# Patient Record
Sex: MALE | Race: OTHER | HISPANIC OR LATINO | URBAN - METROPOLITAN AREA
[De-identification: names, ages, dates, MRNs, and addresses within clinical notes are randomized per-mention and may not be internally consistent; named-entity substitution may affect disease eponyms.]

---
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
## 2024-08-07 NOTE — PATIENT PROFILE ADULT - LIVING ENVIRONMENT
08/07/24 0500   Clinical Encounter Info   Visit Type In person   Visited With Patient;Family/Friend   Reason for Visit Emergency Code   Spiritual Care Consult Needed Spiritual Care eval completed   Referral from Interdisciplinary team   Spiritual Care Visit Preference    Requires Follow Up No   Taxonomy   Intended Effects De-escalte emotionally charged situations;Convey a calming presence;Cindi Affirmation   Methods Exploring hope;Offer emotional support   Interventions Provide compassionate touch;[place]   Patient Assessment   Patient Affect at Time of Visit Alert;In pain   Patient Spiritual Assessment At peace;Anxious   Patient Spiritual Practice Prayer   Patient  Intervention Prayer   Family/Friend Assessment   Family/Friend Name ana (Spouse)   Family/Friend Affect at Time of Visit Alert;Cooperative;Open to  visit   Family/Friend Spiritual Assessment Hopeful   Family/Friend  Intervention  Support;Prayer   Spiritual Plan of Care   Spiritual Plan of Care No plan for follow up at this time      referred to patient as an emergency code (TL2/Fall).  respond to crisis event. Patient was alert and verbal. Pt was open to  visit and conversation.  was able assess pt spirituality.  acknowledged the situation.  was able to interacted w/pt and provide calming presence.  established a supportive/caring relationship.   extended support to our tx team.  remain in tower available for support as needed for Pt or family. Not Further follow up needed at this time for morning  for ES and Sp. Support.      Rev Mckay Prieto M.Div.   [facility]  [facility]  O: [phone]  
no

## (undated) DEVICE — GLV 7.5 PROTEXIS (WHITE)

## (undated) DEVICE — PACK GENERAL LAPAROSCOPY

## (undated) DEVICE — SOL IRR POUR H2O 500ML

## (undated) DEVICE — ENDOCATCH 10MM SPECIMEN POUCH

## (undated) DEVICE — D HELP - CLEARVIEW CLEARIFY SYSTEM

## (undated) DEVICE — DRSG TELFA 3 X 8

## (undated) DEVICE — TROCAR COVIDIEN BLUNT TIP HASSAN 12MM

## (undated) DEVICE — POSITIONER STRAP ARMBOARD VELCRO TS-30

## (undated) DEVICE — TUBING OLYMPUS INSUFFLATION

## (undated) DEVICE — LIGASURE IMPACT

## (undated) DEVICE — TIP METZENBAUM SCISSOR MONOPOLAR ENDOCUT (ORANGE)

## (undated) DEVICE — BLADE SURGICAL #15 CARBON

## (undated) DEVICE — ELCTR GROUNDING PAD ADULT COVIDIEN

## (undated) DEVICE — BASIN SET SINGLE

## (undated) DEVICE — WARMING BLANKET FULL ADULT

## (undated) DEVICE — SUT MONOCRYL 4-0 27" PS-2 UNDYED

## (undated) DEVICE — STAPLER COVIDIEN ENDO GIA STANDARD HANDLE

## (undated) DEVICE — ELCTR BOVIE PENCIL SMOKE EVACUATION

## (undated) DEVICE — LIGASURE MARYLAND 37CM

## (undated) DEVICE — GLV 7.5 PROTEXIS (CREAM) MICRO

## (undated) DEVICE — DRSG BENZOIN 0.6CC

## (undated) DEVICE — DRSG TEGADERM 2.5X3"

## (undated) DEVICE — DISSECTOR ENDOSCOPIC KITTNER SINGLE TIP

## (undated) DEVICE — CANISTER DISPOSABLE THIN WALL 3000CC

## (undated) DEVICE — SOL IRR POUR NS 0.9% 500ML

## (undated) DEVICE — TUBING HYDRO-SURG PLUS IRRIGATOR W SMOKEVAC & PROBE

## (undated) DEVICE — PROTECTOR HEEL / ELBOW FLUFFY

## (undated) DEVICE — DRSG STERISTRIPS 0.5 X 4"

## (undated) DEVICE — TUBING INSUFFLATION LAP FILTER 10FT

## (undated) DEVICE — VENODYNE/SCD SLEEVE CALF MEDIUM

## (undated) DEVICE — TROCAR COVIDIEN VERSAPORT BLADELESS OPTICAL 5MM STANDARD

## (undated) DEVICE — SUT VICRYL 0 27" UR-6